# Patient Record
Sex: FEMALE | Race: WHITE
[De-identification: names, ages, dates, MRNs, and addresses within clinical notes are randomized per-mention and may not be internally consistent; named-entity substitution may affect disease eponyms.]

---

## 2021-02-01 NOTE — RAD
XR Chest 1 View Portable



HISTORY: Tachycardia



COMPARISON: None



FINDINGS: The heart size is normal. The lungs are well expanded without focal areas of consolidation,
 pneumothorax or pleural effusions.



IMPRESSION: No radiographic evidence of acute cardiopulmonary process.



Reported By: Hitesh Dumont 

Electronically Signed:  2/1/2021 1:40 PM

## 2021-02-06 NOTE — EKG
Test Reason : REPEAT

Blood Pressure : ***/*** mmHG

Vent. Rate : 102 BPM     Atrial Rate : 102 BPM

   P-R Int : 136 ms          QRS Dur : 070 ms

    QT Int : 332 ms       P-R-T Axes : 066 032 026 degrees

   QTc Int : 432 ms

 

Sinus tachycardia

Cannot rule out Anterior infarct , age undetermined

Abnormal ECG

#2

 

Confirmed by PEDRO PHILIP DO ([id number]),  MAYA RODRIGUEZ ([id number]) on 2/6/2021 4:15:11 PM

 

Referred By:             Confirmed By:PEDRO PHILIP DO

## 2021-02-06 NOTE — EKG
Test Reason : 

Blood Pressure : ***/*** mmHG

Vent. Rate : 113 BPM     Atrial Rate : 113 BPM

   P-R Int : 136 ms          QRS Dur : 070 ms

    QT Int : 322 ms       P-R-T Axes : 062 027 024 degrees

   QTc Int : 441 ms

 

Sinus tachycardia

Possible Left atrial enlargement

Cannot rule out Anterior infarct , age undetermined

Abnormal ECG

#1

 

Confirmed by PEDRO PHILIP DO ([id number]),  MAYA RODRIGUEZ ([id number]) on 2/6/2021 4:15:01 PM

 

Referred By:             Confirmed By:PEDRO PHILIP DO

## 2021-07-06 ENCOUNTER — HOSPITAL ENCOUNTER (OUTPATIENT)
Dept: HOSPITAL 92 - BICMAMMO | Age: 41
Discharge: HOME | End: 2021-07-06
Attending: ADVANCED PRACTICE MIDWIFE
Payer: COMMERCIAL

## 2021-07-06 DIAGNOSIS — N63.20: Primary | ICD-10-CM

## 2021-07-06 PROCEDURE — G0279 TOMOSYNTHESIS, MAMMO: HCPCS

## 2021-07-06 PROCEDURE — 77066 DX MAMMO INCL CAD BI: CPT
